# Patient Record
Sex: FEMALE | Race: BLACK OR AFRICAN AMERICAN
[De-identification: names, ages, dates, MRNs, and addresses within clinical notes are randomized per-mention and may not be internally consistent; named-entity substitution may affect disease eponyms.]

---

## 2020-09-26 ENCOUNTER — HOSPITAL ENCOUNTER (EMERGENCY)
Dept: HOSPITAL 15 - ER | Age: 60
Discharge: HOME | End: 2020-09-26
Payer: MEDICAID

## 2020-09-26 VITALS — BODY MASS INDEX: 41.65 KG/M2 | HEIGHT: 65 IN | WEIGHT: 250 LBS

## 2020-09-26 VITALS — DIASTOLIC BLOOD PRESSURE: 65 MMHG | SYSTOLIC BLOOD PRESSURE: 137 MMHG

## 2020-09-26 DIAGNOSIS — F41.9: ICD-10-CM

## 2020-09-26 DIAGNOSIS — F17.210: ICD-10-CM

## 2020-09-26 DIAGNOSIS — Z79.84: ICD-10-CM

## 2020-09-26 DIAGNOSIS — Z79.899: ICD-10-CM

## 2020-09-26 DIAGNOSIS — J45.909: ICD-10-CM

## 2020-09-26 DIAGNOSIS — M17.11: ICD-10-CM

## 2020-09-26 DIAGNOSIS — M23.8X1: Primary | ICD-10-CM

## 2020-09-26 DIAGNOSIS — I10: ICD-10-CM

## 2020-09-26 DIAGNOSIS — E11.9: ICD-10-CM

## 2020-09-26 DIAGNOSIS — Z90.49: ICD-10-CM

## 2020-09-26 PROCEDURE — 73562 X-RAY EXAM OF KNEE 3: CPT

## 2020-09-26 PROCEDURE — 99283 EMERGENCY DEPT VISIT LOW MDM: CPT

## 2020-09-26 PROCEDURE — 96372 THER/PROPH/DIAG INJ SC/IM: CPT

## 2021-11-18 ENCOUNTER — HOSPITAL ENCOUNTER (EMERGENCY)
Dept: HOSPITAL 15 - ER | Age: 61
Discharge: HOME | End: 2021-11-18
Payer: MEDICAID

## 2021-11-18 VITALS — SYSTOLIC BLOOD PRESSURE: 149 MMHG | DIASTOLIC BLOOD PRESSURE: 81 MMHG

## 2021-11-18 VITALS — WEIGHT: 247 LBS | HEIGHT: 66 IN | BODY MASS INDEX: 39.7 KG/M2
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
## 2021-11-18 DIAGNOSIS — S63.616A: ICD-10-CM
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
## 2021-11-18 DIAGNOSIS — S29.012A: Primary | ICD-10-CM

## 2021-11-18 PROCEDURE — 73130 X-RAY EXAM OF HAND: CPT

## 2021-11-18 PROCEDURE — 71101 X-RAY EXAM UNILAT RIBS/CHEST: CPT

## 2022-04-05 ENCOUNTER — HOSPITAL ENCOUNTER (EMERGENCY)
Dept: HOSPITAL 15 - ER | Age: 62
Discharge: HOME | End: 2022-04-05
Payer: MEDICAID

## 2022-04-05 VITALS — WEIGHT: 245 LBS | HEIGHT: 66 IN | BODY MASS INDEX: 39.37 KG/M2

## 2022-04-05 VITALS — DIASTOLIC BLOOD PRESSURE: 76 MMHG | SYSTOLIC BLOOD PRESSURE: 166 MMHG

## 2022-04-05 DIAGNOSIS — M54.9: ICD-10-CM

## 2022-04-05 DIAGNOSIS — G89.29: ICD-10-CM
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
## 2022-04-05 DIAGNOSIS — M47.812: ICD-10-CM

## 2022-04-05 DIAGNOSIS — Z90.49: ICD-10-CM

## 2022-04-05 DIAGNOSIS — S16.1XXA: Primary | ICD-10-CM

## 2022-04-05 DIAGNOSIS — Z79.899: ICD-10-CM
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
## 2022-04-05 DIAGNOSIS — I10: ICD-10-CM

## 2022-04-05 DIAGNOSIS — F17.210: ICD-10-CM
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
## 2022-04-05 DIAGNOSIS — J45.909: ICD-10-CM

## 2022-04-05 PROCEDURE — 72040 X-RAY EXAM NECK SPINE 2-3 VW: CPT

## 2022-04-26 ENCOUNTER — HOSPITAL ENCOUNTER (EMERGENCY)
Dept: HOSPITAL 15 - ER | Age: 62
Discharge: HOME | End: 2022-04-26
Payer: MEDICAID

## 2022-04-26 VITALS — SYSTOLIC BLOOD PRESSURE: 185 MMHG | DIASTOLIC BLOOD PRESSURE: 81 MMHG

## 2022-04-26 VITALS — WEIGHT: 220 LBS | BODY MASS INDEX: 35.36 KG/M2 | HEIGHT: 66 IN

## 2022-04-26 DIAGNOSIS — I10: Primary | ICD-10-CM

## 2022-04-26 DIAGNOSIS — E11.9: ICD-10-CM

## 2022-04-26 DIAGNOSIS — Z79.899: ICD-10-CM

## 2022-04-26 DIAGNOSIS — Z90.49: ICD-10-CM

## 2022-04-26 DIAGNOSIS — J45.909: ICD-10-CM

## 2022-04-26 DIAGNOSIS — F17.210: ICD-10-CM

## 2022-04-26 LAB
ALBUMIN SERPL-MCNC: 3.3 G/DL (ref 3.4–5)
ALP SERPL-CCNC: 63 U/L (ref 45–117)
ALT SERPL-CCNC: 18 U/L (ref 13–56)
ANION GAP SERPL CALCULATED.3IONS-SCNC: 6 MMOL/L (ref 5–15)
BILIRUB SERPL-MCNC: 0.5 MG/DL (ref 0.2–1)
BUN SERPL-MCNC: 10 MG/DL (ref 7–18)
BUN/CREAT SERPL: 13.2
CALCIUM SERPL-MCNC: 8.8 MG/DL (ref 8.5–10.1)
CHLORIDE SERPL-SCNC: 108 MMOL/L (ref 98–107)
CO2 SERPL-SCNC: 27 MMOL/L (ref 21–32)
GLUCOSE SERPL-MCNC: 202 MG/DL (ref 74–106)
HCT VFR BLD AUTO: 35.7 % (ref 36–46)
HGB BLD-MCNC: 11.9 G/DL (ref 12.2–16.2)
MCH RBC QN AUTO: 27.7 PG (ref 28–32)
MCV RBC AUTO: 82.9 FL (ref 80–100)
NRBC BLD QL AUTO: 0.2 %
POTASSIUM SERPL-SCNC: 4.4 MMOL/L (ref 3.5–5.1)
PROT SERPL-MCNC: 7.1 G/DL (ref 6.4–8.2)
SODIUM SERPL-SCNC: 141 MMOL/L (ref 136–145)

## 2022-04-26 PROCEDURE — 96374 THER/PROPH/DIAG INJ IV PUSH: CPT

## 2022-04-26 PROCEDURE — 96361 HYDRATE IV INFUSION ADD-ON: CPT

## 2022-04-26 PROCEDURE — 85025 COMPLETE CBC W/AUTO DIFF WBC: CPT

## 2022-04-26 PROCEDURE — 71045 X-RAY EXAM CHEST 1 VIEW: CPT

## 2022-04-26 PROCEDURE — 36415 COLL VENOUS BLD VENIPUNCTURE: CPT

## 2022-04-26 PROCEDURE — 96376 TX/PRO/DX INJ SAME DRUG ADON: CPT

## 2022-04-26 PROCEDURE — 85379 FIBRIN DEGRADATION QUANT: CPT

## 2022-04-26 PROCEDURE — 81001 URINALYSIS AUTO W/SCOPE: CPT

## 2022-04-26 PROCEDURE — 84484 ASSAY OF TROPONIN QUANT: CPT

## 2022-04-26 PROCEDURE — 99284 EMERGENCY DEPT VISIT MOD MDM: CPT

## 2022-04-26 PROCEDURE — 80053 COMPREHEN METABOLIC PANEL: CPT

## 2023-02-19 ENCOUNTER — HOSPITAL ENCOUNTER (EMERGENCY)
Dept: HOSPITAL 15 - ER | Age: 63
Discharge: HOME | End: 2023-02-19
Payer: MEDICAID

## 2023-02-19 VITALS — WEIGHT: 243.17 LBS | BODY MASS INDEX: 39.08 KG/M2 | HEIGHT: 66 IN

## 2023-02-19 VITALS — DIASTOLIC BLOOD PRESSURE: 75 MMHG | SYSTOLIC BLOOD PRESSURE: 130 MMHG

## 2023-02-19 DIAGNOSIS — R10.33: Primary | ICD-10-CM

## 2023-02-19 DIAGNOSIS — E11.9: ICD-10-CM

## 2023-02-19 DIAGNOSIS — I10: ICD-10-CM

## 2023-02-19 DIAGNOSIS — J45.909: ICD-10-CM

## 2023-02-19 DIAGNOSIS — R11.2: ICD-10-CM

## 2023-02-19 DIAGNOSIS — Z79.899: ICD-10-CM

## 2023-02-19 DIAGNOSIS — F17.210: ICD-10-CM

## 2023-02-19 LAB
ALBUMIN SERPL-MCNC: 4 G/DL (ref 3.4–5)
ALP SERPL-CCNC: 62 U/L (ref 45–117)
ALT SERPL-CCNC: 15 U/L (ref 13–56)
ANION GAP SERPL CALCULATED.3IONS-SCNC: 8 MMOL/L (ref 5–15)
BILIRUB SERPL-MCNC: 0.4 MG/DL (ref 0.2–1)
BUN SERPL-MCNC: 7 MG/DL (ref 7–18)
BUN/CREAT SERPL: 8
CALCIUM SERPL-MCNC: 9.4 MG/DL (ref 8.5–10.1)
CHLORIDE SERPL-SCNC: 103 MMOL/L (ref 98–107)
CO2 SERPL-SCNC: 28 MMOL/L (ref 21–32)
GLUCOSE SERPL-MCNC: 161 MG/DL (ref 74–106)
HCT VFR BLD AUTO: 41.8 % (ref 36–46)
HGB BLD-MCNC: 14.1 G/DL (ref 12.2–16.2)
LIPASE SERPL-CCNC: 311 U/L (ref 73–393)
MCH RBC QN AUTO: 27.9 PG (ref 28–32)
MCV RBC AUTO: 83 FL (ref 80–100)
NRBC BLD QL AUTO: 0.3 %
POTASSIUM SERPL-SCNC: 3.5 MMOL/L (ref 3.5–5.1)
PROT SERPL-MCNC: 8.2 G/DL (ref 6.4–8.2)
SODIUM SERPL-SCNC: 139 MMOL/L (ref 136–145)

## 2023-02-19 PROCEDURE — 74176 CT ABD & PELVIS W/O CONTRAST: CPT

## 2023-02-19 PROCEDURE — 85025 COMPLETE CBC W/AUTO DIFF WBC: CPT

## 2023-02-19 PROCEDURE — 93005 ELECTROCARDIOGRAM TRACING: CPT

## 2023-02-19 PROCEDURE — 84484 ASSAY OF TROPONIN QUANT: CPT

## 2023-02-19 PROCEDURE — 36415 COLL VENOUS BLD VENIPUNCTURE: CPT

## 2023-02-19 PROCEDURE — 80053 COMPREHEN METABOLIC PANEL: CPT

## 2023-02-19 PROCEDURE — 81001 URINALYSIS AUTO W/SCOPE: CPT

## 2023-02-19 PROCEDURE — 83690 ASSAY OF LIPASE: CPT

## 2024-08-29 ENCOUNTER — HOSPITAL ENCOUNTER (EMERGENCY)
Dept: HOSPITAL 15 - ER | Age: 64
Discharge: PSYCHIATRIC OR SUBSTANCE USE FACILITY | End: 2024-08-29
Payer: MEDICARE

## 2024-08-29 VITALS
DIASTOLIC BLOOD PRESSURE: 74 MMHG | HEART RATE: 74 BPM | TEMPERATURE: 98.4 F | OXYGEN SATURATION: 96 % | SYSTOLIC BLOOD PRESSURE: 141 MMHG | RESPIRATION RATE: 18 BRPM

## 2024-08-29 VITALS — BODY MASS INDEX: 39.19 KG/M2 | HEIGHT: 66 IN | WEIGHT: 243.83 LBS

## 2024-08-29 DIAGNOSIS — F17.210: ICD-10-CM

## 2024-08-29 DIAGNOSIS — E11.9: ICD-10-CM

## 2024-08-29 DIAGNOSIS — J45.909: ICD-10-CM

## 2024-08-29 DIAGNOSIS — K59.01: Primary | ICD-10-CM

## 2024-08-29 DIAGNOSIS — Z90.49: ICD-10-CM

## 2024-08-29 DIAGNOSIS — I10: ICD-10-CM

## 2024-08-29 PROCEDURE — 74176 CT ABD & PELVIS W/O CONTRAST: CPT

## 2024-08-29 RX ADMIN — MAGNESIUM CITRATE ONE ML: 1.75 LIQUID ORAL at 10:13

## 2025-02-20 ENCOUNTER — HOSPITAL ENCOUNTER (INPATIENT)
Dept: HOSPITAL 15 - ER | Age: 65
LOS: 3 days | Discharge: HOME | DRG: 312 | End: 2025-02-23
Attending: EMERGENCY MEDICINE | Admitting: STUDENT IN AN ORGANIZED HEALTH CARE EDUCATION/TRAINING PROGRAM
Payer: MEDICARE

## 2025-02-20 VITALS — HEART RATE: 70 BPM | OXYGEN SATURATION: 98 % | RESPIRATION RATE: 17 BRPM

## 2025-02-20 VITALS
SYSTOLIC BLOOD PRESSURE: 169 MMHG | TEMPERATURE: 98 F | RESPIRATION RATE: 18 BRPM | HEART RATE: 70 BPM | DIASTOLIC BLOOD PRESSURE: 74 MMHG | OXYGEN SATURATION: 97 %

## 2025-02-20 VITALS — HEART RATE: 75 BPM | OXYGEN SATURATION: 98 % | RESPIRATION RATE: 16 BRPM

## 2025-02-20 VITALS — BODY MASS INDEX: 37.91 KG/M2 | HEIGHT: 66 IN | WEIGHT: 235.89 LBS

## 2025-02-20 DIAGNOSIS — Z86.73: ICD-10-CM

## 2025-02-20 DIAGNOSIS — H66.91: ICD-10-CM

## 2025-02-20 DIAGNOSIS — I95.1: Primary | ICD-10-CM

## 2025-02-20 DIAGNOSIS — G90.89: ICD-10-CM

## 2025-02-20 DIAGNOSIS — E11.9: ICD-10-CM

## 2025-02-20 DIAGNOSIS — J45.909: ICD-10-CM

## 2025-02-20 DIAGNOSIS — Z20.822: ICD-10-CM

## 2025-02-20 DIAGNOSIS — F41.9: ICD-10-CM

## 2025-02-20 DIAGNOSIS — I16.0: ICD-10-CM

## 2025-02-20 DIAGNOSIS — I10: ICD-10-CM

## 2025-02-20 DIAGNOSIS — K21.9: ICD-10-CM

## 2025-02-20 DIAGNOSIS — F17.210: ICD-10-CM

## 2025-02-20 DIAGNOSIS — Z90.49: ICD-10-CM

## 2025-02-20 DIAGNOSIS — E66.01: ICD-10-CM

## 2025-02-20 LAB
ANION GAP SERPL CALCULATED.3IONS-SCNC: 7 MMOL/L (ref 5–15)
BUN SERPL-MCNC: 12 MG/DL (ref 9–23)
BUN/CREAT SERPL: 13.5 (ref 10–20)
CALCIUM SERPL-MCNC: 9.9 MG/DL (ref 8.7–10.4)
CHLORIDE SERPL-SCNC: 105 MMOL/L (ref 98–107)
CO2 SERPL-SCNC: 28 MMOL/L (ref 20–31)
GLUCOSE SERPL-MCNC: 129 MG/DL (ref 74–106)
HCT VFR BLD AUTO: 40.6 % (ref 36–46)
HGB BLD-MCNC: 13.3 G/DL (ref 12.2–16.2)
MCH RBC QN AUTO: 27 PG (ref 28–32)
MCV RBC AUTO: 82.2 FL (ref 80–100)
NRBC BLD QL AUTO: 0.1 %
POTASSIUM SERPL-SCNC: 4 MMOL/L (ref 3.5–5.1)
SODIUM SERPL-SCNC: 140 MMOL/L (ref 136–145)

## 2025-02-20 PROCEDURE — 84550 ASSAY OF BLOOD/URIC ACID: CPT

## 2025-02-20 PROCEDURE — 80307 DRUG TEST PRSMV CHEM ANLYZR: CPT

## 2025-02-20 PROCEDURE — 85025 COMPLETE CBC W/AUTO DIFF WBC: CPT

## 2025-02-20 PROCEDURE — 93005 ELECTROCARDIOGRAM TRACING: CPT

## 2025-02-20 PROCEDURE — 82962 GLUCOSE BLOOD TEST: CPT

## 2025-02-20 PROCEDURE — 93886 INTRACRANIAL COMPLETE STUDY: CPT

## 2025-02-20 PROCEDURE — 84443 ASSAY THYROID STIM HORMONE: CPT

## 2025-02-20 PROCEDURE — 76705 ECHO EXAM OF ABDOMEN: CPT

## 2025-02-20 PROCEDURE — 83690 ASSAY OF LIPASE: CPT

## 2025-02-20 PROCEDURE — 36415 COLL VENOUS BLD VENIPUNCTURE: CPT

## 2025-02-20 PROCEDURE — 70450 CT HEAD/BRAIN W/O DYE: CPT

## 2025-02-20 PROCEDURE — 83880 ASSAY OF NATRIURETIC PEPTIDE: CPT

## 2025-02-20 PROCEDURE — 87804 INFLUENZA ASSAY W/OPTIC: CPT

## 2025-02-20 PROCEDURE — 83036 HEMOGLOBIN GLYCOSYLATED A1C: CPT

## 2025-02-20 PROCEDURE — 84484 ASSAY OF TROPONIN QUANT: CPT

## 2025-02-20 PROCEDURE — 87426 SARSCOV CORONAVIRUS AG IA: CPT

## 2025-02-20 PROCEDURE — 70551 MRI BRAIN STEM W/O DYE: CPT

## 2025-02-20 PROCEDURE — 80048 BASIC METABOLIC PNL TOTAL CA: CPT

## 2025-02-20 PROCEDURE — 80053 COMPREHEN METABOLIC PANEL: CPT

## 2025-02-20 PROCEDURE — 71045 X-RAY EXAM CHEST 1 VIEW: CPT

## 2025-02-20 PROCEDURE — 81001 URINALYSIS AUTO W/SCOPE: CPT

## 2025-02-20 RX ADMIN — MECLIZINE HYDROCHLORIDE SCH MG: 25 TABLET ORAL at 22:48

## 2025-02-20 RX ADMIN — HUMAN INSULIN SCH UNITS: 100 INJECTION, SOLUTION SUBCUTANEOUS at 22:52

## 2025-02-20 RX ADMIN — CEFTRIAXONE SODIUM ONE MLS/HR: 1 INJECTION, POWDER, FOR SOLUTION INTRAMUSCULAR; INTRAVENOUS at 22:43

## 2025-02-20 RX ADMIN — LISINOPRIL SCH MG: 20 TABLET ORAL at 22:48

## 2025-02-20 RX ADMIN — ACETAMINOPHEN SCH MG: 325 TABLET ORAL at 22:49

## 2025-02-20 RX ADMIN — Medication SCH STRIP: at 22:51

## 2025-02-20 RX ADMIN — KETOROLAC TROMETHAMINE SCH MG: 30 INJECTION, SOLUTION INTRAMUSCULAR; INTRAVENOUS at 22:43

## 2025-02-20 NOTE — ECG
[facility]

                                       

Test Date:    2025               Test Time:    11:17:10

Pat Name:     PRINCESS SMITH          Department:   ED

Patient ID:   [id number]           Room:         [address] [place] [address]

Gender:       F                        Technician:   LA

:          1960               Requested By: SALVADOR HENNESSY

Order Number: [id number]        Reading MD:   Brian Jones

                                 Measurements

Intervals                              Axis          

Rate:         75                       P:            27

PA:           176                      QRS:          7

QRSD:         91                       T:            36

QT:           406                                    

QTc:          454                                    

                           Interpretive Statements

Sinus rhythm

Probable left atrial enlargement

LVH with secondary repolarization abnormality

Electronically Signed On 2025 22:27:07 PST by Brian Jones



Please click the below link to view image of tracing.

## 2025-02-20 NOTE — ED.PDOC
HPI (NEURO)


HPI Comments


65 y/o F, with PMHX of HTN, DM, WBW, ANXIETY, and ASTHMA BIBA presents to the ED

for CC of dizziness. Per EMS, patient has been experiencing dizziness with 

associated right lower quadrant abdominal pain [id number]. Patient comments on 

current 8/10 pain. Patient denies weakness, fatigue, nausea, vomiting, or 

diarrhea. No other associated symptom's, modifiers, recent injuries or sick 

contacts at this time.


Chief Complaint:  Dizziness


Time Seen by MD:  11:20


Primary Care Provider:  UNKNOWN


Reviewed Notes:  Nurses Notes, Paramedic Notes, Medications, Allergies


Information Source:  Patient, Emergency Med Personnel


Mode of Arrival:  EMS


Severity:  Mild


Headache Severity:  None


Timing:  Months


Duration:  Since onset


Prehospital treatment:  None


Circumstances:  Spontaneous


Symptoms:  None


History of:  None


Modifying factors:  Nothing


Associated Signs and Symptoms:  None





Past Medical History


PAST MEDICAL HISTORY:  Anxiety, Asthma, DM, HTN


Surgical History:  Cholecystectomy, 


GYN History:  No Pertinent GYN History





Family History


Family History:  Reviewed,noncontributory to illness, Family hx of HTN, Family 

hx of lung keren





Social History


Smoker:  Cigarettes, Less Than 1 Pack/Day


Alcohol:  Occasionally


Drugs:  Denies Drug Use


Lives In:  Home





Constitutional:  denies: chills, diaphoresis, fatigue, fever, malaise, sweats, 

weakness, others


EENTM:  denies: blurred vision, double vision, ear bleeding, ear discharge, ear 

drainage, ear pain, ear ringing, eye pain, eye redness, hearing loss, mouth 

pain, mouth swelling, nasal discharge, nose bleeding, nose congestion, nose 

pain, photophobia, tearing, throat pain, throat swelling, voice changes, others


Respiratory:  denies: cough, hemoptysis, orthopnea, SOB at rest, shortness of 

breath, SOB with excertion, stridor, wheezing, others


Cardiovascular:  denies: chest pain, dizzy spells, diaphoresis, Dyspnea on 

exertion, edema, irregular heart beat, left arm pain, lightheadedness, 

palpitations, PND, syncope, others


Gastrointestinal:  reports: abdominal pain


Genitourinary:  denies: abnormal vagina bleeding, burning, dyspareunia, dysuria,

flank pain, frequency, hematuria, incontinence, pain, pregnant, vagina 

discharge, urgency, others


Neurological:  reports: dizziness; denies: fainting, headache, left sided 

numbness, left sided weakness, numbness, paresthesia, pre-existing deficit, 

right sided numbness, right sided weakness, seizure, speech problems, tingling, 

tremors, weakness, others


Musculoskeletal:  denies: back pain, gout, joint pain, joint swelling, muscle 

pain, muscle stiffness, neck pain, others


Integumetry:  denies: bruises, change in color, change in hair/nails, dryness, 

laceration, lesions, lumps, rash, wounds, others


Allergic/Immunocompromised:  denies: Difficulty Healing, Frequent Infections, 

Hives, Itching, others


Hematologic/Lymphatic:  denies: anemia, blood clots, easy bleeding, easy brui

sing, swollen glands, others


Endocrine:  denies: excessive hunger, excessive sweating, excessive thirst, ex

cessive urination, flushing, intolerance to cold, intolerance to heat, 

unexplained weight gain, unexplained weight loss, others


Psychiatric:  denies: anxiety, bipolar disorder, depression, hopeless, panic 

disorder, schizophrenia, sleepless, suicidal, others


All Other Systems:  Reviewed and Negative





Physical Exam


General Appearance:  Moderate Distress


HEENT:  Normal ENT Inspection, Pharynx Normal, TMs Normal


Neck:  Full Range of Motion, Non-Tender, Normal, Normal Inspection


Respiratory:  Other (Coarse breath sounds)


Cardiovascular:  No Edema, No JVD, No Murmur, No Gallop, Normal Peripheral 

Pulses, Regular Rate/Rhythm


Breast Exam:  Deferred


Gastrointestinal:  No Organomegaly, Non Tender, No Pulsatile Mass, Normal Bowel 

Sounds, Soft


Genitalia:  Deferred


Pelvic:  Deferred


Rectal:  Deferred


Extremities:  No calf tenderness, Normal capillary refill, Normal inspection, 

Normal range of motion, Non-tender, No pedal edema


Musculoskeletal :  


   Apperance:  Normal


Neurologic:  Alert, CNs II-XII nml as Tested, No Motor Deficits, Normal Affect, 

Normal Mood, No Sensory Deficits


Cerebellar Function:  NOT DONE


Reflexes:  NOT DONE


Skin:  Dry, Normal Color, Warm


Peripheral Pulses:  3+ Radial (R), 3+ Radial (L)


Lymphatic:  No Adenopathy





Was a procedure done?


Was a procedure done?:  No





Differential Diagnosis (SZ)


Seizure:  Psychogenic Seizure, Closed Head Injury, CVA/TIA, N/A


General Weakness:  N/A


Headache:  N/A





X-Ray, Labs, Meds, VS





                                   Vital Signs








  Date Time  Temp Pulse Resp B/P (MAP) Pulse Ox O2 Delivery O2 Flow Rate FiO2


 


25 12:35 98.2 75 16 157/77 (103) 98   





 98.2       


 


25 12:20  75 16  98 Room Air* 0 21


 


25 11:17  75      


 


25 11:16 98.2 76 16 190/100 (130) 98   








                                       Lab








Test


 25


11:45 Range/Units


 


 


White Blood Count


 6.0 


 4.4-10.8


10^3/uL


 


Red Blood Count


 4.94 


 4.0-5.20


10^6/uL


 


Hemoglobin 13.3  12.2-16.2  g/dL


 


Hematocrit 40.6  36.0-46.0  %


 


Mean Corpuscular Volume 82.2  80.0-100.0  fL


 


Mean Corpuscular Hemoglobin 27.0 L 28.0-32.0  pg


 


Mean Corpuscular Hemoglobin


Concent 32.8 


 32.0-36.0  g/dL





 


Red Cell Distribution Width 15.2 H 11.8-14.3  %


 


Platelet Count


 240 


 140-450


10^3/uL


 


Mean Platelet Volume 7.6  6.9-10.8  fL


 


Neutrophils (%) (Auto) 65.0  37.0-80.0  %


 


Lymphocytes (%) (Auto) 24.8  10.0-50.0  %


 


Monocytes (%) (Auto) 6.2  0.0-12.0  %


 


Eosinophils (%) (Auto) 3.3  0.0-7.0  %


 


Basophils (%) (Auto) 0.7  0.0-2.0  %


 


Neutrophils # (Auto)


 3.9 


 1.6-8.6  10


^3/uL


 


Lymphocytes # (Auto)


 1.5 


 0.4-5.4  10


^3/uL


 


Monocytes # (Auto) 0.4  0-1.3  10 ^3/uL


 


Eosinophils # (Auto) 0.2  0-0.8  10 ^3/uL


 


Basophils # (Auto) 0  0-0.2  10 ^3/uL


 


Nucleated Red Blood Cells 0.1   %


 


Sodium Level 140  136-145  mmol/L


 


Potassium Level 4.0  3.5-5.1  mmol/L


 


Chloride Level 105    mmol/L


 


Carbon Dioxide Level 28  20-31  mmol/L


 


Anion Gap 7  5-15  


 


Blood Urea Nitrogen 12  9-23  mg/dL


 


Creatinine


 0.89 


 0.550-1.02


mg/dL


 


Glomerular Filtration Rate


Calc 71 


 >90  mL/min





 


BUN/Creatinine Ratio 13.5  10.0-20.0  


 


Serum Glucose 129 H   mg/dL


 


Calcium Level 9.9  8.7-10.4  mg/dL





Patient alert.


Complaining of shortness a breath.  


Vitals stable.  


Answering questions pain


She is also states that she has been having dizziness.


No recent fall.  


Denies headache.  


Possible autonomic disorder.  


WBC within normal limits.  


Hemoglobin within normal limits.  


Explained to the patient.  


Continue monitoring.


EKG reviewed does not show any acute changes.


Time of 1ST Reevaluation:  11:50


Reevaluation 1ST:  Unchanged


Patient Education/Counseling:  Diagnosis, Treatment


Family Education/Counseling:  No Family Present


Additional Information


I reviewed the following notes from patient's past medical history: 24 DX: 

CONSTIPATION DUE TO SLOW TRANSIT,  DX: ACUTE ABD PAIN 





The following tests were ordered, and results were reviewed by me:  CBC, UA, 

BMP, EKG





Additional Information was gathered from interviewing the following independent 

historians: EMS





I discussed treatment and results with medical personnel and: PATIENT





Departure 1


Departure


Time of Disposition:  15:44


Impression:  


   Primary Impression:  


   Autonomic disorder


Disposition:   ADMITTED AS INPATIENT


Admit to:  Med Surg


Condition:  Guarded





Critical Care Note


Critical Care Time?:  No





Stability


Stability form required:  No





Heart Score


Heart Score:  








Heart Score Response (Comments) Value


 


History Slightly Suspicious 0


 


EKG Normal 0


 


Age >65 2


 


Risk Factors >3 or Hx ASHD 2


 


Troponin Normal limit 0


 


Total  4














I personally scribed for SALVADOR HENNESSY MD ([email or web address]) on 25 at 11:29.  

Electronically submitted by Emily Reyes ([email or web address]).


I personally scribed for SALVADOR HENNESSY MD ([email or web address]) on 25 at 11:34.  

Electronically submitted by Emily Reyes ([email or web address]).





SALVADOR HENNESSY MD           2025 11:29

## 2025-02-20 NOTE — DVH
INDICATION: abdominal pain



TECHNIQUE:  Graded compression technique along with Multiple real-time sonographic images were obtain
ed for evaluation of the right lower quadrant.



FINDINGS: The appendix was not visualized. No free fluid or lymph nodes are seen on this exam.



IMPRESSION: 



Nonvisualization of the appendix, thus cannot exclude appendicitis.



Electronically Signed by: Lucio Lange at 02/20/2025 22:07:51 PM

## 2025-02-20 NOTE — DVH
CHEST RADIOGRAPH



Indication: sob



Technique: Single frontal view of the chest was obtained



Comparison: CXRP on DOS: 4/26/22, CHEST PORTABLE on DOS: 4/26/22



FINDINGS: 



Lines and Tubes: None



Lungs: No focal consolidation. 2.3 x 0.4 cm density overlying the right upper lung zone which may be 
external to the patient.



Pleura: No effusion.



No pneumothorax. 



Cardiomediastinal contours: Unremarkable



Bones: No acute osseous abnormality.



IMPRESSION:



No acute cardiopulmonary disease.



2.3 x 0.4 cm density overlying the right upper lung zone which may be external to the patient. Recomm
end clinical correlation.



Electronically Signed by: Zenia Johnson at 02/20/2025 16:11:20 PM

## 2025-02-21 VITALS
DIASTOLIC BLOOD PRESSURE: 71 MMHG | TEMPERATURE: 97.9 F | SYSTOLIC BLOOD PRESSURE: 153 MMHG | RESPIRATION RATE: 18 BRPM | OXYGEN SATURATION: 97 % | HEART RATE: 84 BPM

## 2025-02-21 VITALS — RESPIRATION RATE: 18 BRPM | OXYGEN SATURATION: 97 % | HEART RATE: 84 BPM

## 2025-02-21 VITALS
RESPIRATION RATE: 18 BRPM | TEMPERATURE: 97.9 F | OXYGEN SATURATION: 98 % | SYSTOLIC BLOOD PRESSURE: 106 MMHG | DIASTOLIC BLOOD PRESSURE: 55 MMHG | HEART RATE: 85 BPM

## 2025-02-21 VITALS
DIASTOLIC BLOOD PRESSURE: 68 MMHG | RESPIRATION RATE: 18 BRPM | SYSTOLIC BLOOD PRESSURE: 152 MMHG | OXYGEN SATURATION: 100 % | TEMPERATURE: 98.3 F | HEART RATE: 82 BPM

## 2025-02-21 VITALS
OXYGEN SATURATION: 97 % | DIASTOLIC BLOOD PRESSURE: 84 MMHG | HEART RATE: 84 BPM | TEMPERATURE: 98.4 F | RESPIRATION RATE: 20 BRPM | SYSTOLIC BLOOD PRESSURE: 174 MMHG

## 2025-02-21 VITALS — SYSTOLIC BLOOD PRESSURE: 157 MMHG | HEART RATE: 91 BPM | DIASTOLIC BLOOD PRESSURE: 73 MMHG

## 2025-02-21 VITALS — RESPIRATION RATE: 18 BRPM | OXYGEN SATURATION: 97 % | HEART RATE: 80 BPM

## 2025-02-21 VITALS — HEART RATE: 70 BPM | OXYGEN SATURATION: 99 % | RESPIRATION RATE: 18 BRPM

## 2025-02-21 VITALS
HEART RATE: 80 BPM | RESPIRATION RATE: 18 BRPM | SYSTOLIC BLOOD PRESSURE: 158 MMHG | TEMPERATURE: 98.2 F | OXYGEN SATURATION: 96 % | DIASTOLIC BLOOD PRESSURE: 73 MMHG

## 2025-02-21 LAB
ALBUMIN SERPL-MCNC: 4.3 G/DL (ref 3.2–4.8)
ALP SERPL-CCNC: 59 U/L (ref 46–116)
ALT SERPL-CCNC: < 9 U/L (ref 7–40)
ANION GAP SERPL CALCULATED.3IONS-SCNC: 7 MMOL/L (ref 5–15)
BILIRUB SERPL-MCNC: 0.5 MG/DL (ref 0.2–1)
BUN SERPL-MCNC: 14 MG/DL (ref 9–23)
BUN/CREAT SERPL: 16.7 (ref 10–20)
CALCIUM SERPL-MCNC: 10 MG/DL (ref 8.7–10.4)
CHLORIDE SERPL-SCNC: 106 MMOL/L (ref 98–107)
CO2 SERPL-SCNC: 27 MMOL/L (ref 20–31)
GLUCOSE SERPL-MCNC: 108 MG/DL (ref 74–106)
HCT VFR BLD AUTO: 39.8 % (ref 36–46)
HGB BLD-MCNC: 13 G/DL (ref 12.2–16.2)
LIPASE SERPL-CCNC: 64 U/L (ref 12–53)
MCH RBC QN AUTO: 27 PG (ref 28–32)
MCV RBC AUTO: 82.6 FL (ref 80–100)
NRBC BLD QL AUTO: 0.1 %
POTASSIUM SERPL-SCNC: 3.7 MMOL/L (ref 3.5–5.1)
PROT SERPL-MCNC: 6.8 G/DL (ref 5.7–8.2)
PROT UR STRIP.AUTO-MCNC: (no result) MG/DL
SARS-COV+SARS-COV-2 AG RESP QL IA.RAPID: NEGATIVE
SODIUM SERPL-SCNC: 140 MMOL/L (ref 136–145)

## 2025-02-21 RX ADMIN — GABAPENTIN SCH MG: 100 CAPSULE ORAL at 21:26

## 2025-02-21 RX ADMIN — HYDRALAZINE HYDROCHLORIDE PRN MG: 20 INJECTION INTRAMUSCULAR; INTRAVENOUS at 12:20

## 2025-02-21 RX ADMIN — CEFTRIAXONE SODIUM SCH MLS/HR: 1 INJECTION, POWDER, FOR SOLUTION INTRAMUSCULAR; INTRAVENOUS at 23:29

## 2025-02-21 RX ADMIN — POLYETHYLENE GLYCOL 3350 ONE GM: 17 POWDER, FOR SOLUTION ORAL at 16:33

## 2025-02-21 RX ADMIN — ATORVASTATIN CALCIUM SCH MG: 20 TABLET, FILM COATED ORAL at 21:26

## 2025-02-21 RX ADMIN — DOCUSATE SODIUM SCH MG: 100 CAPSULE, LIQUID FILLED ORAL at 21:25

## 2025-02-21 RX ADMIN — DOCUSATE SODIUM ONE MG: 100 CAPSULE, LIQUID FILLED ORAL at 16:33

## 2025-02-21 RX ADMIN — HYDRALAZINE HYDROCHLORIDE ONE MG: 20 INJECTION INTRAMUSCULAR; INTRAVENOUS at 01:15

## 2025-02-21 NOTE — DVHHPRES
History of Present Illness


Resident Creating Document:  WILMER TAYLOR RESIDENT


Reason for Visit:  Hypertensive Urgency


History of Present Illness


A 64-year-old female with past medical history of hypertension and diabetes who 

came to the ED due to worsening of the dizziness, patient stated that she has 

been having dizziness for months but now it is getting worse, states that the 

room spinning and she is having difficulty to walk due to the imbalance and 

dizziness, also she stated that she is having ear infections and she had 

received antibiotics due to that.  Patient also state that she has been having 

abdominal pain in the lower right quadrant for months as well but denied any 

other symptoms, denies any shortness of breath denies vomiting denies chest pain

denies other symptoms denied dysuria





Home meds:  Atenolol carisoprodol gabapentin [place] ibuprofen lisinopril 

metformin omeprazole paroxetine senna tizanidine





Review of Systems


Constitutional:  No: Fever, Chills, Sweats, Weakness, Malaise, Other


Eyes:  No: Pain, Vision change, Conjunctivae inflammation, Eyelid inflammation, 

Other, Redness


ENT:  No: Ear pain, Ear discharge, Nose pain, Nose discharge, Nose congestion, 

Mouth pain, Mouth swelling, Throat pain, Throat swelling, Other


Respiratory:  No: Cough, Dry, Shortness of breath, SOB with excertion, Wheezing,

Hemoptysis, Pleuritic Pain, Sputum, Wheezing, Other


Cardiovascular:  No: Chest Pain, Palpitations, Orthopnea, Paroxysmal Noc. 

Dyspnea, Edema, Lt Headedness, Other


Gastrointestinal:  No: Nausea, Vomiting, Abdominal Pain, Diarrhea, Constipation,

Melena, Hematochezia, Other


Genitourinary:  No Dysuria, No Frequency, No Incontinence, No Hematuria, No Ret

ention, No Other


Musculoskeletal:  No: other, neck pain, shoulder pain, arm pain, back pain, hand

pain, leg pain, foot pain


Skin:  No: Rash, Lesions, Jaundice, Bruising, Other


Neurological:  No: Weakness, Numbness, Incoordination, Change in speech, 

Confusion, Seizures, Other


Allergies:  


Coded Allergies:  


     NO KNOWN ALLERGIES (Unverified , 9/19/15)


Medications





                               Current Medications








 Medications  Dose


 Ordered  Sig/Chuckie


 Route  Start Time


 Stop Time Status Last Admin


Dose Admin


 


 Lisinopril  20 mg  DAILY


 PO  2/20/25 21:30


    2/20/25 22:48


20 MG


 


 Diagnostic Test


  (Pha)  1 strip  ACHS


 VI  2/20/25 22:00


    2/20/25 22:51


1 STRIP


 


 Insulin Human


 Regular    ACHS


 SC  2/20/25 22:00


     





 


 Dextrose  50 ml  UD  PRN


 IV  2/20/25 21:30


     





 


 Meclizine HCl  12.5 mg  BID


 PO  2/20/25 22:00


    2/20/25 22:48


12.5 MG


 


 Acetaminophen  325 mg  Q4HP


 PO  2/20/25 21:45


    2/20/25 22:49


325 MG


 


 Ketorolac


 Tromethamine  15 mg  Q8HPRN


 IV  2/20/25 22:00


 2/25/25 21:59  2/20/25 22:43


15 MG


 


 Hydralazine HCl  10 mg  Q6HP  PRN


 IV  2/20/25 23:45


     





 


 Amlodipine


 Besylate  5 mg  DAILY


 PO  2/21/25 10:00


     





 


 Ceftriaxone Sodium  50 ml @ 


 100 mls/hr  DAILY@09


 IV  2/21/25 09:00


   UNV  














Exam


Vital Signs





Vital Signs








  Date Time  Temp Pulse Resp B/P (MAP) Pulse Ox O2 Delivery O2 Flow Rate FiO2


 


2/21/25 01:15    173/74    


 


2/20/25 23:37  70 17  98 Room Air* 0 21


 


2/20/25 23:15 98.2       





 98.2       








General Appearance:  Alert, Oriented X3, Cooperative


HEENT:  Atraumatic, PERRLA, Other (right ear canal with wax, erithematous, 

tender at the entrance of the otoscopy)


Respiratory:  Clear to auscultation, Normal air movement


Cardiovascular:  Regular rate, Normal S1


Abdominal:  Other (mild tenderness in the lower right abdomen, no CVA tenderness

)


Extremities:  No clubbing, No cyanosis, No edema


Skin:  No rashes, No breakdown, No significant lesion


Neuro:  Normal gait, Normal speech


Psych/Mental Status:  Mental status NL, Mood NL





Labs/Xrays





                                      Labs








Test


 2/20/25


23:45 2/20/25


22:47 2/20/25


11:45 Range/Units


 


 


Urine Color Yellow    Yellow  


 


Urine Clarity Turbid H   Clear  


 


Urine pH 5.5    5.0-9.0  


 


Urine Specific Gravity 1.025    1.001-1.035  


 


Urine Protein Trace H   Negative  


 


Urine Ketones Negative    Negative  


 


Urine Blood Trace H   Negative  /uL


 


Urine Nitrite Negative    Negative  


 


Urine Bilirubin Negative    Negative  


 


Urine Urobilinogen Normal    Negative  mg/dL


 


Urine Leukocyte Esterase Negative    Negative  /uL


 


Urine RBC 6    0 - 4  /hpf


 


Urine Microscopic WBC 2    0-5  /HPF


 


Urine Squamous Epithelial


Cells Few 


 


 


 <5  /hpf





 


Urine Calcium Oxalate Crystals Mod    None Seen  


 


Urine Bacteria Few H   None Seen  /hpf


 


Urine Mucus Few    None Seen  


 


Urine Glucose Normal    Normal  mg/dL


 


POC Glucose  125 H    mg/dl


 


White Blood Count


 


 


 6.0 


 4.4-10.8


10^3/uL


 


Red Blood Count


 


 


 4.94 


 4.0-5.20


10^6/uL


 


Hemoglobin   13.3  12.2-16.2  g/dL


 


Hematocrit   40.6  36.0-46.0  %


 


Mean Corpuscular Volume   82.2  80.0-100.0  fL


 


Mean Corpuscular Hemoglobin   27.0 L 28.0-32.0  pg


 


Mean Corpuscular Hemoglobin


Concent 


 


 32.8 


 32.0-36.0  g/dL





 


Red Cell Distribution Width   15.2 H 11.8-14.3  %


 


Platelet Count


 


 


 240 


 140-450


10^3/uL


 


Mean Platelet Volume   7.6  6.9-10.8  fL


 


Neutrophils (%) (Auto)   65.0  37.0-80.0  %


 


Lymphocytes (%) (Auto)   24.8  10.0-50.0  %


 


Monocytes (%) (Auto)   6.2  0.0-12.0  %


 


Eosinophils (%) (Auto)   3.3  0.0-7.0  %


 


Basophils (%) (Auto)   0.7  0.0-2.0  %


 


Neutrophils # (Auto)


 


 


 3.9 


 1.6-8.6  10


^3/uL


 


Lymphocytes # (Auto)


 


 


 1.5 


 0.4-5.4  10


^3/uL


 


Monocytes # (Auto)   0.4  0-1.3  10 ^3/uL


 


Eosinophils # (Auto)   0.2  0-0.8  10 ^3/uL


 


Basophils # (Auto)   0  0-0.2  10 ^3/uL


 


Nucleated Red Blood Cells   0.1   %


 


Sodium Level   140  136-145  mmol/L


 


Potassium Level   4.0  3.5-5.1  mmol/L


 


Chloride Level   105    mmol/L


 


Carbon Dioxide Level   28  20-31  mmol/L


 


Anion Gap   7  5-15  


 


Blood Urea Nitrogen   12  9-23  mg/dL


 


Creatinine


 


 


 0.89 


 0.550-1.02


mg/dL


 


Glomerular Filtration Rate


Calc 


 


 71 


 >90  mL/min





 


BUN/Creatinine Ratio   13.5  10.0-20.0  


 


Serum Glucose   129 H   mg/dL


 


Calcium Level   9.9  8.7-10.4  mg/dL











Assessment/Plan


Assessment/Plan


#Hypertensive urgency 


#Otitis media unresolved 


#Chronic Abdominal pain


#Diabetes 





Admit 


Med/surg 


Lisinopril 


Amlodipine 


Hydralazine PRN 


Head CTscan 


Ceftriaxone due to otitis that unresolved after AB PO 


Meclizine 


Labs for tomorrow 








Case discussed with Dr Paulino 


Time spent on care 23 min


Plan discussed with:  Patient, Other


My Orders





                        Orders - WILMER TAYLOR RESIDENT








Procedure Category Date Status





  Time 


 


Admit ADMIT 2/20/25 Transmitted





  21:25 


 


Lisinopril Tablet PHA 2/20/25 In Process





 (Zestril Tablet)  21:30 


 


Glucose Blood PHA 2/20/25 In Process





 (Accu-Chek Comfort  22:00 


 


Insulin R (Human) PHA 2/20/25 In Process





 (Insulin R)  22:00 


 


Dextrose 50% Syringe PHA 2/20/25 In Process





  21:30 


 


Drug Screen LAB 2/20/25 In Process





  21:35 


 


Meclizine Tablet PHA 2/20/25 In Process





 (Antivert Tablet)  22:00 


 


Acetaminophen Tablet PHA 2/20/25 In Process





 (Tylenol Tablet)  21:45 


 


Ketorolac Injection PHA 2/20/25 In Process





 (Toradol Injection)  22:00 


 


Right Lower Quad US 2/20/25 Resulted





  21:25 


 


Consistent DIET 2/21/25 Transmitted





Carb(Ccho)Diabetes  Breakfast 


 


Hydralazine Injection PHA 2/20/25 In Process





 (Apresoline Inject  23:45 


 


Amlodipine Tablet PHA 2/21/25 In Process





 (Norvasc Tablet)  10:00 


 


Ceftriaxone 1gm/50ml PHA 2/21/25 Logged





D5w (Rocephin)  09:00 











Date of Service:  Feb 20, 2025


Billing Provider:  ALEXANDRA PAULINO MD


Common Visit Codes:  99223-INITIAL  INP/OBS CARE (HIGH)


Secondary Visit Codes:  99497-ADVANCED CARE PLAN 30 MINUTES











WILMER TAYLOR RESIDENT        Feb 21, 2025 01:44


ALEXANDRA PAULINO MD            Feb 21, 2025 11:32

## 2025-02-21 NOTE — DVHPNRES
Progress Note


Date Seen:  Feb 21, 2025


Resident Creating Document:  EMMA RAMSAY RESIDENT


Medical Necessity Reason


Pt with a Central, PICC or Fol:  No





Subjective


Review of Systems





 64-year-old female with PMHx hypertension, diabetes mellitus type 2, 

nicotine dependence who presented to the ER with a chief complaint of dizziness 

for the past 1 month associated with decreased hearing from the right side.  She

also reports stuffy nose.  She went to her PCP and was given some antibiotics, 

patient does not remember the name.  She reports with moving her head or neck 

and standing up or walking gives her dizziness and she feels like the room is 

spinning.  Review of system positive for constipation and back pain.





PCP Dr. De La Rosa





Social history, lives with son and daughter, smokes 4 cigarettes daily





Home meds:  Atenolol carisoprodol gabapentin [place] ibuprofen lisinopril 

metformin omeprazole paroxetine senna tizanidine








Patient seen and examined at the bedside. 























Objective


vital signs





                                   Vital Sign








  Date Time  Temp Pulse Resp B/P (MAP) Pulse Ox O2 Delivery O2 Flow Rate FiO2


 


2/21/25 16:45 98.3 82 18 152/68 (96) 100   





 98.3       


 


2/21/25 08:00      Room Air* 0 21














                           Total Intake and Output   


 


 2/20/25 2/20/25 2/21/25





 15:00 23:00 07:00


 


Intake Total   320 ml


 


Balance   320 ml








medications





                               Current Medications








 Medications  Dose


 Ordered  Sig/Chuckie


 Route  Start Time


 Stop Time Status Last Admin


Dose Admin


 


 Lisinopril  20 mg  DAILY


 PO  2/20/25 21:30


    2/21/25 09:12


20 MG


 


 Diagnostic Test


  (Pha)  1 strip  ACHS


 VI  2/20/25 22:00


    2/21/25 17:31


1 STRIP


 


 Insulin Human


 Regular    ACHS


 SC  2/20/25 22:00


     





 


 Dextrose  50 ml  UD  PRN


 IV  2/20/25 21:30


     





 


 Meclizine HCl  12.5 mg  BID


 PO  2/20/25 22:00


    2/21/25 09:11


12.5 MG


 


 Acetaminophen  325 mg  Q4HP


 PO  2/20/25 21:45


    2/21/25 16:37


325 MG


 


 Ketorolac


 Tromethamine  15 mg  Q8HPRN


 IV  2/20/25 22:00


 2/25/25 21:59  2/21/25 12:15


15 MG


 


 Hydralazine HCl  10 mg  Q6HP  PRN


 IV  2/20/25 23:45


    2/21/25 12:20


10 MG


 


 Ceftriaxone Sodium  50 ml @ 


 100 mls/hr  Q24H


 IV  2/21/25 23:00


     





 


 Amlodipine


 Besylate  10 mg  DAILY


 PO  2/22/25 10:00


     





 


 Chlorthalidone  12.5 mg  DAILY@BREAKFAST


 PO  2/22/25 08:00


     





 


 Aspirin  81 mg  DAILY


 PO  2/22/25 10:00


     





 


 Atorvastatin


 Calcium  40 mg  HS


 PO  2/21/25 22:00


     





 


 Docusate Sodium  100 mg  BID


 PO  2/21/25 22:00


     











Examination


Morbidly obese lying in bed, in no acute distress





General:  Obese, afebrile, palor, mucosae are moist.  Right tympanic membrane is

erythematous.


Cardiovascular:  Regular S1 and S2.  No murmurs, gallops or rubs.  No JVD 

elevation.  No pedal edema


Respiratory: Normal B/L air entry on room air.  Clear lung sounds on 

auscultation


Abdomen: Soft, nontender, nondistended, normoactive bowel sounds, no rebound 

tenderness, no organomegaly, no masses


Genitourinary:  Deferred


MSK/skin: Mobilizes 4 limbs.  Skin is dry and warm


Neurological: No motor, no sensitive deficits, normal speech.  Pupils are 

isocoric and reactive.


Psych/Mental Status:  A/Ox3


laboratory and microbiology


                                Laboratory Tests


2/21/25 02:40

















Test


 2/21/25


02:40 Range/Units


 


 


Serum Glucose 108 H   mg/dL








Labs and/or images reviewed:  Labs reviewed by me, Image(s) reviewed by me





Problem List/Assessment/Plan


Problem List/Assessment/Plan





hypertensive urgency w/o EOD


Uncontrolled hypertension


Started lisinopril 20 mg and amlodipine 10 mg daily


Chlorthalidone 12.5 mg started








Dizziness


Otitis media-right-sided


IV ceftriaxone 1 g 2/21 daily


Continue meclizine daily


Orthostatic vital pending





Old CVA


CT scan showed Chronic appearing infarct in the left frontal lobe.


Aspirin 81 mg atorvastatin 40 mg initiated





Type 2 diabetes mellitus-hemoglobin A1c 6.7


On mild ISS


Home medication gabapentin 100 mg TID resumed





Morbidly obese


Counseled regarding lifestyle modifications for more than 22 minutes





Nicotine dependence


Nicotine patch daily 7 mg





GERD


Pantoprazole 40 mg daily





Goals of care discussed with patient for more than 22 minutes, full code status





Plan discussed with patient in which all questions have been answered





Case discussed with Dr. Gallego


Plan discussed with:  Patient





My Orders


My Orders





                          Orders - EMMA RAMSAY








Procedure Category Date Status





  Time 


 


Orthostatic Vital ORDERS 2/21/25 Transmitted





Signs  12:13 


 


Orthostatic Vital ED NURSING 2/21/25 Transmitted





Signs   


 


Chlorthalidone PHA 2/22/25 In Process





 (Chlorthalidone)  08:00 


 


Aspirin Tablet PHA 2/22/25 In Process





  10:00 


 


Atorvastatin (Lipitor) PHA 2/21/25 In Process





  22:00 


 


Amlodipine Tablet PHA 2/22/25 In Process





 (Norvasc Tablet)  10:00 


 


Docusate Sodium PHA 2/21/25 In Process





Capsule (Colace  22:00 

















EMMA RAMSAY RESIDENT            Feb 21, 2025 17:39

## 2025-02-21 NOTE — DVH
EXAM: CT HEAD WITHOUT CONTRAST



INDICATION: hypertesnive urgency and dizziness



TECHNIQUE: CT of the head without intravenous contrast. Coronal and sagittal reformatted images are s
ubmitted.



Radiation Dose : 1. Head: CT Dose: CTDI volume is 59.31 mGy. Dose-length product is 950.71 mGy*cm



The dose indicators for CT are the volume Computed Tomography (CT) Dose Index (CTDIvol) and the Dose 
Length Product (DLP), and are measured in units of mGy and mGy-cm, respectively. These indicators are
 not patient dose, but values generated from the CT scanner acquisition factors. The report includes 
radiation exposure data for exposures received during this examination.  All CT scans at this medical
 facility are performed using dose modulation techniques as appropriate to a performed exam including
 the following: Automated exposure control was utilized; adjustment of the MA and/or KV according to 
patient size; and use of iterative reconstruction technique.



COMPARISON: None



FINDINGS: 



There is no evidence of acute intracranial hemorrhage, extra-axial collection, mass effect, midline s
hift, herniation or hydrocephalus.



There are periventricular and subcortical hypodensities, nonspecific, but likely reflecting sequelae 
of chronic microvascular ischemic changes.



Chronic appearing infarct in the left frontal lobe.



The ventricles, sulci and cisterns are age appropriate.



The gray-white differentiation is intact. 



The visualized paranasal sinuses and mastoid air cells are clear. 



No depressed calvarial fracture. The surrounding soft tissues are unremarkable.



IMPRESSION: 



1. No evidence of acute intracranial hemorrhage or mass effect.



2. Chronic appearing infarct in the left frontal lobe.



Electronically Signed by: Magaly Chavez at 02/21/2025 08:37:39 AM

## 2025-02-22 VITALS
DIASTOLIC BLOOD PRESSURE: 59 MMHG | TEMPERATURE: 98 F | OXYGEN SATURATION: 99 % | HEART RATE: 71 BPM | SYSTOLIC BLOOD PRESSURE: 120 MMHG | RESPIRATION RATE: 16 BRPM

## 2025-02-22 VITALS — HEART RATE: 70 BPM | SYSTOLIC BLOOD PRESSURE: 162 MMHG | DIASTOLIC BLOOD PRESSURE: 77 MMHG

## 2025-02-22 VITALS
RESPIRATION RATE: 16 BRPM | HEART RATE: 75 BPM | DIASTOLIC BLOOD PRESSURE: 83 MMHG | SYSTOLIC BLOOD PRESSURE: 171 MMHG | OXYGEN SATURATION: 97 % | TEMPERATURE: 98.4 F

## 2025-02-22 VITALS — RESPIRATION RATE: 16 BRPM | HEART RATE: 90 BPM | OXYGEN SATURATION: 95 %

## 2025-02-22 VITALS
TEMPERATURE: 98.1 F | OXYGEN SATURATION: 97 % | DIASTOLIC BLOOD PRESSURE: 88 MMHG | HEART RATE: 81 BPM | RESPIRATION RATE: 18 BRPM | SYSTOLIC BLOOD PRESSURE: 158 MMHG

## 2025-02-22 VITALS
SYSTOLIC BLOOD PRESSURE: 129 MMHG | HEART RATE: 82 BPM | DIASTOLIC BLOOD PRESSURE: 75 MMHG | RESPIRATION RATE: 18 BRPM | OXYGEN SATURATION: 98 % | TEMPERATURE: 98.6 F

## 2025-02-22 VITALS — SYSTOLIC BLOOD PRESSURE: 144 MMHG | DIASTOLIC BLOOD PRESSURE: 69 MMHG

## 2025-02-22 VITALS
RESPIRATION RATE: 16 BRPM | TEMPERATURE: 98.5 F | OXYGEN SATURATION: 95 % | SYSTOLIC BLOOD PRESSURE: 160 MMHG | HEART RATE: 90 BPM | DIASTOLIC BLOOD PRESSURE: 85 MMHG

## 2025-02-22 VITALS — RESPIRATION RATE: 19 BRPM | OXYGEN SATURATION: 95 % | HEART RATE: 70 BPM

## 2025-02-22 LAB
ANION GAP SERPL CALCULATED.3IONS-SCNC: 7 MMOL/L (ref 5–15)
BUN SERPL-MCNC: 9 MG/DL (ref 9–23)
BUN/CREAT SERPL: 12 (ref 10–20)
CALCIUM SERPL-MCNC: 9.7 MG/DL (ref 8.7–10.4)
CHLORIDE SERPL-SCNC: 106 MMOL/L (ref 98–107)
CO2 SERPL-SCNC: 27 MMOL/L (ref 20–31)
GLUCOSE SERPL-MCNC: 119 MG/DL (ref 74–106)
LIPASE SERPL-CCNC: 45 U/L (ref 12–53)
POTASSIUM SERPL-SCNC: 3.9 MMOL/L (ref 3.5–5.1)
SODIUM SERPL-SCNC: 140 MMOL/L (ref 136–145)

## 2025-02-22 RX ADMIN — ACETAMINOPHEN SCH MG: 325 TABLET ORAL at 13:17

## 2025-02-22 RX ADMIN — ATENOLOL SCH MG: 25 TABLET ORAL at 11:29

## 2025-02-22 RX ADMIN — SODIUM PHOSPHATE, DIBASIC AND SODIUM PHOSPHATE, MONOBASIC ONE ML: 7; 19 ENEMA RECTAL at 23:54

## 2025-02-22 RX ADMIN — PANTOPRAZOLE SODIUM SCH MG: 40 TABLET, DELAYED RELEASE ORAL at 05:13

## 2025-02-22 NOTE — DVH
EXAM: MRI BRAIN HEAD WO CONTRAST



CLINICAL HISTORY: [id number] dizziness, hx of stroke



COMPARISON: CT head 02/21/2025



TECHNIQUE:  



Multiplanar, multisequence magnetic resonance imaging of the brain was performed without intravenous 
contrast.   



FINDINGS:



1.7 x 1.7 x 1.4 cm round area of T2/FLAIR hyperintensity over the left centrum semiovale which appear
s isointense to cortex on T1 with internal lateral curvilinear area which follows CSF signal. There i
s no diffusion restriction associated with the area.



Normal brain volume information. Bilateral basal ganglia calcification. Mild chronic small-vessel isc
hemic changes.



No hemorrhages, midline shift, herniation or cytotoxic edema following a large vascular territory. No
 intra-axial or extra-axial fluid collections. No evidence of hydrocephalus. The basal cisterns are p
atent. Visualized vascular flow voids are maintained.



The pituitary gland, sella and parasellar regions unremarkable. The cerebellar tonsils are normal pos
ition.  The cerebellum is unremarkable. 



The orbits and globes are unremarkable. Paranasal sinuses and mastoids are clear. There are No worris
ome calvarial lesions.



IMPRESSION: 



No evidence of acute subacute infarct.



1.7 x 1.7 x 1.4 cm round area of T2/FLAIR hyperintensity over the left centrum semiovale which appear
s isointense to cortex on T1 with internal lateral curvilinear area which follows CSF signal. Contras
t-enhanced MRI is recommended to evaluate for possible intracranial lesion.



Electronically Signed by: Zenia Johnson at 02/22/2025 17:04:43 PM

## 2025-02-22 NOTE — DVHPNRES
Progress Note


Date Seen:  Feb 22, 2025


Resident Creating Document:  EMMA RAMSAY RESIDENT


Medical Necessity Reason


Pt with a Central, PICC or Fol:  No





Subjective


Review of Systems


 64-year-old female with PMHx hypertension, diabetes mellitus type 2, 

nicotine dependence who presented to the ER with a chief complaint of dizziness 

for the past 1 month associated with decreased hearing from the right side.  She

also reports stuffy nose.  She went to her PCP and was given some antibiotics, 

patient does not remember the name.  She reports with moving her head or neck 

and standing up or walking gives her dizziness and she feels like the room is 

spinning.  Review of system positive for constipation and back pain.





PCP Dr. De La Rosa





Social history, lives with son and daughter, smokes 4 cigarettes daily





Home meds:  Atenolol carisoprodol gabapentin [place] ibuprofen lisinopril 

metformin omeprazole paroxetine senna tizanidine








Patient seen and examined at the bedside.  Negative Palmyra-Hallpike maneuver, ruled

out BPPV.  Carotid ultrasound ordered MRI brain ordered.





Objective


vital signs





                                   Vital Sign








  Date Time  Temp Pulse Resp B/P (MAP) Pulse Ox O2 Delivery O2 Flow Rate FiO2


 


2/22/25 13:00 98.4 75 16 171/83 (112) 97   





 98.4       


 


2/22/25 08:00      Room Air* 0 21














                           Total Intake and Output   


 


 2/21/25 2/21/25 2/22/25





 15:00 23:00 07:00


 


Intake Total  760 ml 800 ml


 


Output Total  800 ml 


 


Balance  -40 ml 800 ml








medications





                               Current Medications








 Medications  Dose


 Ordered  Sig/Chuckie


 Route  Start Time


 Stop Time Status Last Admin


Dose Admin


 


 Lisinopril  20 mg  DAILY


 PO  2/20/25 21:30


    2/22/25 08:24


20 MG


 


 Diagnostic Test


  (Pha)  1 strip  ACHS


 VI  2/20/25 22:00


    2/22/25 11:37


1 STRIP


 


 Insulin Human


 Regular    ACHS


 SC  2/20/25 22:00


     





 


 Dextrose  50 ml  UD  PRN


 IV  2/20/25 21:30


     





 


 Meclizine HCl  12.5 mg  BID


 PO  2/20/25 22:00


    2/22/25 08:24


12.5 MG


 


 Ketorolac


 Tromethamine  15 mg  Q8HPRN


 IV  2/20/25 22:00


 2/25/25 21:59  2/22/25 13:16


15 MG


 


 Hydralazine HCl  10 mg  Q6HP  PRN


 IV  2/20/25 23:45


    2/21/25 12:20


10 MG


 


 Ceftriaxone Sodium  50 ml @ 


 100 mls/hr  Q24H


 IV  2/21/25 23:00


    2/21/25 23:29


100 MLS/HR


 


 Amlodipine


 Besylate  10 mg  DAILY


 PO  2/22/25 10:00


    2/22/25 08:22


10 MG


 


 Chlorthalidone  12.5 mg  DAILY@BREAKFAST


 PO  2/22/25 08:00


    2/22/25 08:25


12.5 MG


 


 Aspirin  81 mg  DAILY


 PO  2/22/25 10:00


    2/22/25 08:22


81 MG


 


 Atorvastatin


 Calcium  40 mg  HS


 PO  2/21/25 22:00


    2/21/25 21:26


40 MG


 


 Docusate Sodium  100 mg  BID


 PO  2/21/25 22:00


    2/22/25 08:21


100 MG


 


 Gabapentin  100 mg  BID


 PO  2/21/25 22:00


    2/22/25 08:21


100 MG


 


 Paroxetine HCl  25 mg  DAILY


 PO  2/22/25 10:00


     





 


 Pantoprazole


 Sodium  40 mg  DAILY@0600


 PO  2/22/25 06:00


    2/22/25 05:13


40 MG


 


 Atenolol  25 mg  BID


 PO  2/22/25 10:00


    2/22/25 11:29


25 MG


 


 Acetaminophen  650 mg  Q6HP


 PO  2/22/25 12:00


    2/22/25 13:17


650 MG








Examination


Morbidly obese lying in bed, in no acute distress





General:  Obese, afebrile, palor, mucosae are moist.  Right tympanic membrane is

erythematous.  Negative Palmyra-Hallpike maneuver.


Cardiovascular:  Regular S1 and S2.  No murmurs, gallops or rubs.  No JVD 

elevation.  No pedal edema


Respiratory: Normal B/L air entry on room air.  Clear lung sounds on 

auscultation


Abdomen: Soft, nontender, nondistended, normoactive bowel sounds, no rebound 

tenderness, no organomegaly, no masses


Genitourinary:  Deferred


MSK/skin: Mobilizes 4 limbs.  Skin is dry and warm


Neurological: No motor, no sensitive deficits, normal speech.  Pupils are 

isocoric and reactive.


Psych/Mental Status:  A/Ox3


laboratory and microbiology


                                Laboratory Tests


2/22/25 06:57








2/21/25 02:40

















Test


 2/22/25


06:57 Range/Units


 


 


Serum Glucose 119 H   mg/dL








Labs and/or images reviewed:  Labs reviewed by me, Image(s) reviewed by me





Problem List/Assessment/Plan


Problem List/Assessment/Plan





hypertensive urgency w/o EOD


Uncontrolled hypertension


Started lisinopril 20 mg and amlodipine 10 mg daily


Chlorthalidone 12.5 mg started








Dizziness ?  Rule out acute stroke


Acute Otitis media-right-sided


Ruled out BPPV


IV ceftriaxone 1 g 2/21 daily


Continue meclizine daily


Orthostatic vital pending


Palmyra-Hallpike maneuver performed bilaterally twice, both times negative.


Follow up with carotid ultrasound and MRI brain





Old CVA


CT scan showed Chronic appearing infarct in the left frontal lobe.


Aspirin 81 mg atorvastatin 40 mg initiated





Type 2 diabetes mellitus-hemoglobin A1c 6.7


On mild ISS


Home medication gabapentin 100 mg TID resumed





Morbidly obese


Counseled regarding lifestyle modifications for more than 22 minutes





Nicotine dependence


Nicotine patch daily 7 mg





GERD


Pantoprazole 40 mg daily





Goals of care discussed with patient for more than 22 minutes, full code status





Plan discussed with patient in which all questions have been answered





Case discussed with Dr. Fernando


Plan discussed with:  Patient





My Orders


My Orders





                          Orders - EMMA RAMSAY








Procedure Category Date Status





  Time 


 


Docusate Sodium PHA 2/21/25 In Process





Capsule (Colace  22:00 


 


Gabapentin Capsule PHA 2/21/25 In Process





 (Neurontin Capsule)  22:00 


 


Paroxetine Tablet PHA 2/22/25 In Process





 (Paxil Tablet)  10:00 


 


Pantoprazole Tablet PHA 2/22/25 In Process





 (Protonix Tablet)  06:00 


 


Atenolol Tablet PHA 2/22/25 In Process





 (Tenormin Tablet)  10:00 


 


Orthostatic Vital ORDERS 2/22/25 Transmitted





Signs  11:20 


 


Orthostatic Vital ED NURSING 2/22/25 Transmitted





Signs   


 


Communication Order ORDERS 2/22/25 Transmitted





  11:20 


 


Carotid Duplx W Color US 2/22/25 Resulted





DOP  11:20 


 


Acetaminophen Tablet PHA 2/22/25 In Process





 (Tylenol Tablet)  12:00 


 


Brain Head Wo Contrast MRI 2/22/25 Logged





  13:30 











Date of Service:  Feb 22, 2025


Billing Provider:  JANA FERNANDO MD


Common Visit Codes:  [id number] INP/OBS CARE(HIGH)











ALI,EMMALIBORIO ZELAYA            Feb 22, 2025 14:25


JANA FERNANDO MD             Feb 23, 2025 19:25

## 2025-02-22 NOTE — DVH
Carotid Duplex



Clinical History: Dizziness history of stroke



Comparison: None



Technique: 



Duplex Doppler evaluation of the extracranial carotid and vertebral arteries including color Doppler 
and spectral/pulsed waveform analysis was performed.



Findings: 



RIGHT SIDE: 



The peak systolic velocities are 69 cm/s in the CCA, 67 cm/s in the ICA. The ICA/CCA ratio is  1.0. V
isually there is mild atherosclerosis of the carotid bulb.



The external carotid artery is patent with peak systolic velocity of 80 cm/s proximally.



There is appropriate antegrade flow in the right vertebral artery.



LEFT SIDE: 



The peak systolic velocities are 59 cm/s in the CCA, 82 cm/s in the ICA. The ICA/CCA ratio is  1.4. V
isually there is mild atherosclerosis at the carotid bulb.



The external carotid artery is patent with peak systolic velocity of 52 cm/s proximally.



There is appropriate antegrade flow in the left vertebral artery.



IMPRESSION: 



No evidence of hemodynamically significant stenosis  in the right or left carotid system.



--------------------------------------------------------------------------



Reference: Radiology 2003; 229:340-346



Normal



ICA PSV is <125 cm/sec and no plaque or intimal thickening is visible sonographically



additional criteria include ICA/CCA PSV ratio <2.0 and ICA EDV <40 cm/sec



<50% ICA stenosis



ICA PSV is <125 cm/sec and plaque or intimal thickening is visible sonographically



additional criteria include ICA/CCA PSV ratio <2.0 and ICA EDV <40 cm/sec



50-69% ICA stenosis



ICA PSV is 125-230 cm/sec and plaque is visible sonographically



additional criteria include ICA/CCA PSV ratio of 2.0-4.0 and ICA EDV of  cm/sec



 70% ICA stenosis but less than near occlusion



ICA PSV is >230 cm/sec and visible plaque and luminal narrowing are seen at gray-scale and color Dopp
ler ultrasound (the higher the Doppler parameters lie above the threshold of 230 cm/sec, the greater 
the likelihood of severe disease)



additional criteria include ICA/CCA PSV ratio >4 and ICA EDV >100 cm/sec



Electronically Signed by: Zach Brumfield at 02/22/2025 14:11:46 PM

## 2025-02-23 VITALS
HEART RATE: 74 BPM | OXYGEN SATURATION: 100 % | DIASTOLIC BLOOD PRESSURE: 78 MMHG | RESPIRATION RATE: 17 BRPM | TEMPERATURE: 97.9 F | SYSTOLIC BLOOD PRESSURE: 151 MMHG

## 2025-02-23 VITALS
OXYGEN SATURATION: 100 % | RESPIRATION RATE: 17 BRPM | DIASTOLIC BLOOD PRESSURE: 67 MMHG | TEMPERATURE: 97.9 F | SYSTOLIC BLOOD PRESSURE: 145 MMHG | HEART RATE: 74 BPM

## 2025-02-23 VITALS
DIASTOLIC BLOOD PRESSURE: 60 MMHG | TEMPERATURE: 98.1 F | RESPIRATION RATE: 16 BRPM | SYSTOLIC BLOOD PRESSURE: 145 MMHG | HEART RATE: 62 BPM | OXYGEN SATURATION: 99 %

## 2025-02-23 VITALS
OXYGEN SATURATION: 99 % | DIASTOLIC BLOOD PRESSURE: 72 MMHG | RESPIRATION RATE: 18 BRPM | HEART RATE: 63 BPM | SYSTOLIC BLOOD PRESSURE: 145 MMHG | TEMPERATURE: 98.1 F

## 2025-02-23 VITALS — OXYGEN SATURATION: 93 % | RESPIRATION RATE: 18 BRPM | HEART RATE: 72 BPM

## 2025-02-23 VITALS
SYSTOLIC BLOOD PRESSURE: 169 MMHG | RESPIRATION RATE: 18 BRPM | OXYGEN SATURATION: 93 % | TEMPERATURE: 98.3 F | DIASTOLIC BLOOD PRESSURE: 85 MMHG | HEART RATE: 72 BPM

## 2025-02-23 LAB
ANION GAP SERPL CALCULATED.3IONS-SCNC: 9 MMOL/L (ref 5–15)
BUN SERPL-MCNC: 14 MG/DL (ref 9–23)
BUN/CREAT SERPL: 17.3 (ref 10–20)
CALCIUM SERPL-MCNC: 9.7 MG/DL (ref 8.7–10.4)
CHLORIDE SERPL-SCNC: 105 MMOL/L (ref 98–107)
CO2 SERPL-SCNC: 26 MMOL/L (ref 20–31)
GLUCOSE SERPL-MCNC: 110 MG/DL (ref 74–106)
POTASSIUM SERPL-SCNC: 3.8 MMOL/L (ref 3.5–5.1)
SODIUM SERPL-SCNC: 140 MMOL/L (ref 136–145)

## 2025-02-23 NOTE — DVHDSRES
Discharge Summary


Date of Admission


Resident Creating Document:  EMMA RAMSAY RESIDENT


2025 at 21:25





Date of Discharge:


2025





Admitting Diagnosis


Acute dizziness, rule out stroke





Labs/Diagnostic Data:





                               Laboratory Results








Test


 25


11:12 25


06:26 25


06:57 25


07:00


 


POC Glucose


 138 mg/dl


() 


 


 





 


Sodium Level


 


 140 mmol/L


(136-145) 


 





 


Potassium Level


 


 3.8 mmol/L


(3.5-5.1) 


 





 


Chloride Level


 


 105 mmol/L


() 


 





 


Carbon Dioxide Level


 


 26 mmol/L


(20-31) 


 





 


Anion Gap  9 (5-15)   


 


Blood Urea Nitrogen


 


 14 mg/dL


(9-23) 


 





 


Creatinine


 


 0.81 mg/dL


(0.550-1.02) 


 





 


Glomerular Filtration Rate


Calc 


 80 mL/min


(>90) 


 





 


BUN/Creatinine Ratio


 


 17.3


(10.0-20.0) 


 





 


Serum Glucose


 


 110 mg/dL


() 


 





 


Calcium Level


 


 9.7 mg/dL


(8.7-10.4) 


 





 


Lipase   45 U/L (12-53)  


 


Influenza Type A Antigen


 


 


 


 Negative


(Negative)


 


Influenza Type B Antigen


 


 


 


 Negative


(Negative)


 


SARS-CoV-2 Antigen (Rapid)


 


 


 


 Negative


(NEGATIVE)


 


Test


 25


02:40 25


23:45 


 





 


White Blood Count


 8.7 10^3/uL


(4.4-10.8) 


 


 





 


Red Blood Count


 4.82 10^6/uL


(4.0-5.20) 


 


 





 


Hemoglobin


 13.0 g/dL


(12.2-16.2) 


 


 





 


Hematocrit


 39.8 %


(36.0-46.0) 


 


 





 


Mean Corpuscular Volume


 82.6 fL


(80.0-100.0) 


 


 





 


Mean Corpuscular Hemoglobin


 27.0 pg


(28.0-32.0) 


 


 





 


Mean Corpuscular Hemoglobin


Concent 32.6 g/dL


(32.0-36.0) 


 


 





 


Red Cell Distribution Width


 15.2 %


(11.8-14.3) 


 


 





 


Platelet Count


 229 10^3/uL


(140-450) 


 


 





 


Mean Platelet Volume


 7.5 fL


(6.9-10.8) 


 


 





 


Neutrophils (%) (Auto)


 56.4 %


(37.0-80.0) 


 


 





 


Lymphocytes (%) (Auto)


 32.5 %


(10.0-50.0) 


 


 





 


Monocytes (%) (Auto)


 7.1 %


(0.0-12.0) 


 


 





 


Eosinophils (%) (Auto)


 3.2 %


(0.0-7.0) 


 


 





 


Basophils (%) (Auto)


 0.8 %


(0.0-2.0) 


 


 





 


Neutrophils # (Auto)


 4.9 10 ^3/uL


(1.6-8.6) 


 


 





 


Lymphocytes # (Auto)


 2.8 10 ^3/uL


(0.4-5.4) 


 


 





 


Monocytes # (Auto)


 0.6 10 ^3/uL


(0-1.3) 


 


 





 


Eosinophils # (Auto)


 0.3 10 ^3/uL


(0-0.8) 


 


 





 


Basophils # (Auto)


 0.1 10 ^3/uL


(0-0.2) 


 


 





 


Nucleated Red Blood Cells 0.1 %    


 


Hemoglobin A1c


 6.7 % A1C


(<5.7) 


 


 





 


Uric Acid


 4.0 mg/dL


(3.1-7.8) 


 


 





 


Total Bilirubin


 0.5 mg/dL


(0.2-1.0) 


 


 





 


Aspartate Amino Transferase


(AST) 11 U/L (13-40) 


 


 


 





 


Alanine Aminotransferase (ALT) < 9 U/L (7-40)    


 


Alkaline Phosphatase


 59 U/L


() 


 


 





 


Troponin I High Sensitivity


 16 ng/L


(</=34) 


 


 





 


B-Type Natriuretic Peptide


 17.35 pg/mL


(0-100) 


 


 





 


Total Protein


 6.8 g/dL


(5.7-8.2) 


 


 





 


Albumin


 4.3 g/dL


(3.2-4.8) 


 


 





 


Thyroid Stimulating Hormone


(TSH) 1.78 uIU/mL


(0.55-4.78) 


 


 





 


Urine Color


 


 Yellow


(Yellow) 


 





 


Urine Clarity  Turbid (Clear)   


 


Urine pH  5.5 (5.0-9.0)   


 


Urine Specific Gravity


 


 1.025


(1.001-1.035) 


 





 


Urine Protein


 


 Trace


(Negative) 


 





 


Urine Ketones


 


 Negative


(Negative) 


 





 


Urine Blood


 


 Trace /uL


(Negative) 


 





 


Urine Nitrite


 


 Negative


(Negative) 


 





 


Urine Bilirubin


 


 Negative


(Negative) 


 





 


Urine Urobilinogen


 


 Normal mg/dL


(Negative) 


 





 


Urine Leukocyte Esterase


 


 Negative /uL


(Negative) 


 





 


Urine RBC  6 /hpf (0 - 4)   


 


Urine Microscopic WBC  2 /HPF (0-5)   


 


Urine Squamous Epithelial


Cells 


 Few /hpf (<5) 


 


 





 


Urine Calcium Oxalate Crystals


 


 Mod (None


Seen) 


 





 


Urine Bacteria


 


 Few /hpf (None


Seen) 


 





 


Urine Mucus


 


 Few (None


Seen) 


 





 


Urine Glucose


 


 Normal mg/dL


(Normal) 


 





 


Urine Opiates Screen  Neg (NEGATIVE)   


 


Urine Fentanyl Screen  Neg (NEGATIVE)   


 


Urine Barbiturates Screen  Neg (NEGATIVE)   


 


Urine Phencyclidine Screen  Neg (NEGATIVE)   


 


Urine Amphetamines Screen  Neg (NEGATIVE)   


 


Urine Benzodiazepines Screen  Neg (NEGATIVE)   


 


Urine Cocaine Screen  Neg (NEGATIVE)   


 


Urine Cannabinoids Screen  Neg (NEGATIVE)   





                             Other Laboratory Tests


25 06:26








25 02:40











Brief Hx & Hospital Course:


HPI-  64-year-old female with PMHx hypertension, diabetes mellitus type 

2, nicotine dependence who presented to the ER with a chief complaint of 

dizziness for the past 1 month associated with decreased hearing from the right 

side.  She also reports stuffy nose.  She went to her PCP and was given some 

antibiotics, patient does not remember the name.  She reports with moving her 

head or neck and standing up or walking gives her dizziness and she feels like 

the room is spinning.  Review of system positive for constipation and back pain.





Hospital course-patient came to the hospital due to dizziness.  Patient was 

admitted to the hospital to rule out acute stroke.  MRI of the brain-No evidence

of acute subacute infarct.  CT head-. No evidence of acute intracranial 

hemorrhage or mass effect.2. Chronic appearing infarct in the left frontal lobe.

 Carotid Doppler- No evidence of hemodynamically significant stenosis  in the 

right or left carotid system.  CXR no acute cardiopulmonary disease.  Patient 

was treated conservatively and symptoms improved.  Patient was adamant about 

going home today.  Patient was advised to follow up with the primary care 

physician in 1 week.  Patient was also advised to keep a log of blood pressure 

and pulse rate so primary care physician can review.  Patient was also advised 

to change body vision gradually to about any benign positional 

vertigo/orthostatic hypotension.  Patient was also advised to follow up with the

primary care physician in 1 week,, avoid dehydration.  Patient verbalized 

understanding.  Patient was hemodynamically stable on discharge.














Diagnosis-


hypertensive urgency w/o EOD


Uncontrolled hypertension


Suspected orthostatic hypotension


Dizziness ?  Ruled out acute stroke


Acute Otitis media-right-sided


Ruled out BPPV


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


Type 2 diabetes mellitus-hemoglobin A1c 6.7


Morbidly obese


Nicotine dependence


GERD








Discharge plan-


Please resume home medications


Please follow up with the primary care physician in 1 week


Please keep a record of your blood pressure and pulse rate so your primary care 

physician can review the chart


Please avoid dehydration


Please change your body position gradually to avoid positional hypotension or 

orthostatic hypotension


Compression socks while walking around, does not have to be on while lying down 

on bed





Operations or Procedures


                             [address] [address]


                              Ph: [phone]





                               DIAGNOSTIC IMAGING


                      Diagnostic Imaging Report : [id number]


                                     Signed








PATIENT: PRINCESS SMITH  ACCT: [id number]        UNIT: [id number]


: 1960           LOC: [facility]            ROOM / BED: [facility] / A


AGE / SEX: 64 / F         ADM STATUS: ADM IN        SERVICE DT: 25 1330





ORDERING PHYSICIAN: EMMA RAMSAY RESIDENT


PROCEDURE(s): MBHL - BRAIN HEAD WO CONTRAST


REASON: persostent dizziness, hx of stroke


ORDER NUMBER(s): [id number], ACCESSION NUMBER(s): [id number]








EXAM: MRI BRAIN HEAD WO CONTRAST





CLINICAL HISTORY: [id number] dizziness, hx of stroke





COMPARISON: CT head 2025





TECHNIQUE:  





Multiplanar, multisequence magnetic resonance imaging of the brain was performed

without intravenous contrast.   





FINDINGS:





1.7 x 1.7 x 1.4 cm round area of T2/FLAIR hyperintensity over the left centrum 

semiovale which appears isointense to cortex on T1 with internal lateral 

curvilinear area which follows CSF signal. There is no diffusion restriction 

associated with the area.





Normal brain volume information. Bilateral basal ganglia calcification. Mild 

chronic small-vessel ischemic changes.





No hemorrhages, midline shift, herniation or cytotoxic edema following a large 

vascular territory. No intra-axial or extra-axial fluid collections. No evidence

of hydrocephalus. The basal cisterns are patent. Visualized vascular flow voids 

are maintained.





The pituitary gland, sella and parasellar regions unremarkable. The cerebellar 

tonsils are normal position.  The cerebellum is unremarkable. 





The orbits and globes are unremarkable. Paranasal sinuses and mastoids are 

clear. There are No worrisome calvarial lesions.





IMPRESSION: 





No evidence of acute subacute infarct.





1.7 x 1.7 x 1.4 cm round area of T2/FLAIR hyperintensity over the left centrum 

semiovale which appears isointense to cortex on T1 with internal lateral 

curvilinear area which follows CSF signal. Contrast-enhanced MRI is recommended 

to evaluate for possible intracranial lesion.





Electronically Signed by: Zenia Trujillo at 2025 17:04:43 PM











DICTATED BY: ZENIA TRUJILLO DO


DICTATED DATE/TIME: 25





SIGNED BY: ZENIA TRUJILLO DO


SIGNED DATE/TIME: 25





CC: 





                             [place] [address]


                              Ph: [phone]





                               DIAGNOSTIC IMAGING


                      Diagnostic Imaging Report : [id number]


                                     Signed








PATIENT: PRINCESS SMITH  ACCT: [id number]        UNIT: [id number]


: 1960           LOC: [facility]            ROOM / BED: [address] [place] [address]


AGE / SEX: 64 / F         ADM STATUS: ADM IN        SERVICE DT: 25





ORDERING PHYSICIAN: EMMA RAMSAY RESIDENT


PROCEDURE(s): CARCL - CAROTID DUPLX W COLOR DOP


REASON: Dizziness history of stroke


ORDER NUMBER(s): [id number], ACCESSION NUMBER(s): [id number]








Carotid Duplex





Clinical History: Dizziness history of stroke





Comparison: None





Technique: 





Duplex Doppler evaluation of the extracranial carotid and vertebral arteries 

including color Doppler and spectral/pulsed waveform analysis was performed.





Findings: 





RIGHT SIDE: 





The peak systolic velocities are 69 cm/s in the CCA, 67 cm/s in the ICA. The 

ICA/CCA ratio is  1.0. Visually there is mild atherosclerosis of the carotid 

bulb.





The external carotid artery is patent with peak systolic velocity of 80 cm/s 

proximally.





There is appropriate antegrade flow in the right vertebral artery.





LEFT SIDE: 





The peak systolic velocities are 59 cm/s in the CCA, 82 cm/s in the ICA. The 

ICA/CCA ratio is  1.4. Visually there is mild atherosclerosis at the carotid 

bulb.





The external carotid artery is patent with peak systolic velocity of 52 cm/s 

proximally.





There is appropriate antegrade flow in the left vertebral artery.





IMPRESSION: 





No evidence of hemodynamically significant stenosis  in the right or left 

carotid system.





--------------------------------------------------------------------------





Reference: Radiology 2003; 229:340-346





Normal





ICA PSV is <125 cm/sec and no plaque or intimal thickening is visible 

sonographically





additional criteria include ICA/CCA PSV ratio <2.0 and ICA EDV <40 cm/sec





<50% ICA stenosis





ICA PSV is <125 cm/sec and plaque or intimal thickening is visible 

sonographically





additional criteria include ICA/CCA PSV ratio <2.0 and ICA EDV <40 cm/sec





50-69% ICA stenosis





ICA PSV is 125-230 cm/sec and plaque is visible sonographically





additional criteria include ICA/CCA PSV ratio of 2.0-4.0 and ICA EDV of  

cm/sec





 70% ICA stenosis but less than near occlusion





ICA PSV is >230 cm/sec and visible plaque and luminal narrowing are seen at 

gray-scale and color Doppler ultrasound (the higher the Doppler parameters lie 

above the threshold of 230 cm/sec, the greater the likelihood of severe disease)





additional criteria include ICA/CCA PSV ratio >4 and ICA EDV >100 cm/sec





Electronically Signed by: Coleman Brumfield at 2025 14:11:46 PM











DICTATED BY: COLEMAN BRUMFIELD DO


DICTATED DATE/TIME: 25 141





SIGNED BY: COLEMAN BRUMFIELD DO


SIGNED DATE/TIME: 25 141





CC: 





                             [place] [address]


                              Ph: [phone]





                               DIAGNOSTIC IMAGING


                      Diagnostic Imaging Report : [id number]


                                     Signed








PATIENT: PRINCESS SMITH  ACCT: [id number]        UNIT: [id number]


: 1960           LOC: [facility]            ROOM / BED: [facility] / A


AGE / SEX: 64 / F         ADM STATUS: ADM IN        SERVICE DT: 25 013





ORDERING PHYSICIAN: WILMER TAYLOR RESIDENT


PROCEDURE(s): HWOCT - HEAD WITHOUT CONTRAST


REASON: hypertesnive urgency and dizziness 


ORDER NUMBER(s): [id number], ACCESSION NUMBER(s): [id number]








EXAM: CT HEAD WITHOUT CONTRAST





INDICATION: hypertesnive urgency and dizziness





TECHNIQUE: CT of the head without intravenous contrast. Coronal and sagittal 

reformatted images are submitted.





Radiation Dose : 1. Head: CT Dose: CTDI volume is 59.31 mGy. Dose-length product

is 950.71 mGy*cm





The dose indicators for CT are the volume Computed Tomography (CT) Dose Index 

(CTDIvol) and the Dose Length Product (DLP), and are measured in units of mGy 

and mGy-cm, respectively. These indicators are not patient dose, but values 

generated from the CT scanner acquisition factors. The report includes radiation

exposure data for exposures received during this examination.  All CT scans at 

this medical facility are performed using dose modulation techniques as 

appropriate to a performed exam including the following: Automated exposure 

control was utilized; adjustment of the MA and/or KV according to patient size; 

and use of iterative reconstruction technique.





COMPARISON: None





FINDINGS: 





There is no evidence of acute intracranial hemorrhage, extra-axial collection, 

mass effect, midline shift, herniation or hydrocephalus.





There are periventricular and subcortical hypodensities, nonspecific, but likely

reflecting sequelae of chronic microvascular ischemic changes.





Chronic appearing infarct in the left frontal lobe.





The ventricles, sulci and cisterns are age appropriate.





The gray-white differentiation is intact. 





The visualized paranasal sinuses and mastoid air cells are clear. 





No depressed calvarial fracture. The surrounding soft tissues are unremarkable.





IMPRESSION: 





1. No evidence of acute intracranial hemorrhage or mass effect.





2. Chronic appearing infarct in the left frontal lobe.





Electronically Signed by: Rachel Whiteside at 2025 08:37:39 AM











DICTATED BY: RACHEL WHITESIDE MD


DICTATED DATE/TIME: 25





SIGNED BY: RACHEL WHITESIDE MD


SIGNED DATE/TIME: 25





CC: 











                             [place] [address]


                              Ph: [phone]





                               DIAGNOSTIC IMAGING


                      Diagnostic Imaging Report : [id number]


                                     Signed








PATIENT: PRINCESS SMITH  ACCT: [id number]        UNIT: [id number]


: 1960           LOC: [facility]             ROOM / BED: [facility] / A


AGE / SEX: 64 / F         ADM STATUS: ADM IN        SERVICE DT: 25





ORDERING PHYSICIAN: WILMER TAYOLR RESIDENT


PROCEDURE(s): RTLQD - RIGHT LOWER QUAD


REASON: abdominal pain


ORDER NUMBER(s): [id number], ACCESSION NUMBER(s): [id number]








INDICATION: abdominal pain





TECHNIQUE:  Graded compression technique along with Multiple real-time 

sonographic images were obtained for evaluation of the right lower quadrant.





FINDINGS: The appendix was not visualized. No free fluid or lymph nodes are seen

on this exam.





IMPRESSION: 





Nonvisualization of the appendix, thus cannot exclude appendicitis.





Electronically Signed by: Lucio Hillman at 2025 22:07:51 PM











DICTATED BY: LUCIO HILLMAN MD


DICTATED DATE/TIME: 25





SIGNED BY: LUCIO HILLMAN MD


SIGNED DATE/TIME: 25





CC: 











                             [place] [address]


                              Ph: [phone]





                               DIAGNOSTIC IMAGING


                      Diagnostic Imaging Report : [id number]


                                     Signed








PATIENT: PRINCESS SMITH  ACCT: [id number]        UNIT: [id number]


: 1960           LOC: ER                   ROOM / BED:  / 


AGE / SEX: 64 / F         ADM STATUS: REG ER        SERVICE DT: 25 1546





ORDERING PHYSICIAN: SALVADOR HENNESSY MD


PROCEDURE(s): CXRP - CHEST PORTABLE


REASON: sob


ORDER NUMBER(s): [id number], ACCESSION NUMBER(s): [id number]








CHEST RADIOGRAPH





Indication: sob





Technique: Single frontal view of the chest was obtained





Comparison: CXRP on DOS: 22, CHEST PORTABLE on DOS: 22





FINDINGS: 





Lines and Tubes: None





Lungs: No focal consolidation. 2.3 x 0.4 cm density overlying the right upper 

lung zone which may be external to the patient.





Pleura: No effusion.





No pneumothorax. 





Cardiomediastinal contours: Unremarkable





Bones: No acute osseous abnormality.





IMPRESSION:





No acute cardiopulmonary disease.





2.3 x 0.4 cm density overlying the right upper lung zone which may be external 

to the patient. Recommend clinical correlation.





Electronically Signed by: Zenia Trujillo at 2025 16:11:20 PM











DICTATED BY: ZENIA TRUJILLO DO


DICTATED DATE/TIME: 25 161





SIGNED BY: ZENIA TRUJILLO DO


SIGNED DATE/TIME: 25





CC:





Condition at Discharge:


Stable





Final Diagnosis/Problems List





hypertensive urgency w/o EOD


Uncontrolled hypertension


Suspected orthostatic hypotension


Dizziness ?  Ruled out acute stroke


Acute Otitis media-right-sided


Ruled out BPPV


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


Type 2 diabetes mellitus-hemoglobin A1c 6.7


Morbidly obese


Nicotine dependence





Discharge Disposition:


Home





Discharge Instruct/Medications


Diet:  Consistent carbohydrate, Cardiac 2g Na,low cholest


Activity:  Light activity


Follow Up/Referral:  


Please follow up with your primary care physician in 1 week





Please keep a log of your blood pressure and pulse rate so primary care 


physician can reviewed chart





Please gradually change in position to avoid benign positional vertigo





Please avoid dehydration


Medications:  


Meclizine 12.5 mg by mouth 2 times a day for 7 days as needed


 Please resume home medications as recommended by primary care physician


Please resume home medications


Please follow up with the primary care physician in 1 week


Please keep a record of your blood pressure and pulse rate so your primary


care physician can review the chart


Please avoid dehydration


Please change your body position gradually to avoid positional hypotension


or orthostatic hypotension


Compression socks while walking around, does not have to be on while lying


down on bed


Discharge Statement:


"Patient was advised to return to the ER or call 911 if any headaches, 

dizziness, shortness of breath, chest pain, abdominal pain, bleeding, fevers, or

worsening of medical condition.





Patient was counseled about treatment plan, medications, possible side effects, 

patientverbalized understanding. All questions were answered to the best of my 

ability.





This discharge took greater then 30 minutes in planning, reviewing 

documentation, counseling the patient, and discussing with other team members."





ASSESSMENT


hypertensive urgency w/o EOD


Uncontrolled hypertension


Suspected orthostatic hypotension


Dizziness   Ruled out acute stroke, likely orthostatic hypotension


Acute Otitis media-right-sided


Ruled out BPPV





Date of Service:  2025


Billing Provider:  JANA FERNANDO MD


Common Visit Codes:  15554-XZB/OBS DISCH DAY >30min











PRANAY GOLDMAN RESIDENT         2025 11:50


JANA FERNANDO MD             2025 19:25